# Patient Record
Sex: MALE | Race: WHITE
[De-identification: names, ages, dates, MRNs, and addresses within clinical notes are randomized per-mention and may not be internally consistent; named-entity substitution may affect disease eponyms.]

---

## 2017-09-01 ENCOUNTER — HOSPITAL ENCOUNTER (OUTPATIENT)
Dept: HOSPITAL 75 - PREOP | Age: 72
End: 2017-09-01
Attending: SURGERY
Payer: COMMERCIAL

## 2017-09-01 VITALS — BODY MASS INDEX: 33.18 KG/M2 | WEIGHT: 245 LBS | HEIGHT: 72 IN

## 2017-09-01 DIAGNOSIS — K80.20: ICD-10-CM

## 2017-09-01 DIAGNOSIS — Z01.818: Primary | ICD-10-CM

## 2017-09-08 ENCOUNTER — HOSPITAL ENCOUNTER (OUTPATIENT)
Dept: HOSPITAL 75 - SDC | Age: 72
Discharge: HOME | End: 2017-09-08
Attending: SURGERY
Payer: COMMERCIAL

## 2017-09-08 VITALS — BODY MASS INDEX: 33.18 KG/M2 | WEIGHT: 245 LBS | HEIGHT: 72 IN

## 2017-09-08 VITALS — SYSTOLIC BLOOD PRESSURE: 152 MMHG | DIASTOLIC BLOOD PRESSURE: 80 MMHG

## 2017-09-08 VITALS — SYSTOLIC BLOOD PRESSURE: 147 MMHG | DIASTOLIC BLOOD PRESSURE: 83 MMHG

## 2017-09-08 VITALS — SYSTOLIC BLOOD PRESSURE: 148 MMHG | DIASTOLIC BLOOD PRESSURE: 82 MMHG

## 2017-09-08 VITALS — SYSTOLIC BLOOD PRESSURE: 160 MMHG | DIASTOLIC BLOOD PRESSURE: 100 MMHG

## 2017-09-08 DIAGNOSIS — J45.909: ICD-10-CM

## 2017-09-08 DIAGNOSIS — G47.33: ICD-10-CM

## 2017-09-08 DIAGNOSIS — K21.9: ICD-10-CM

## 2017-09-08 DIAGNOSIS — E66.9: ICD-10-CM

## 2017-09-08 DIAGNOSIS — I10: ICD-10-CM

## 2017-09-08 DIAGNOSIS — K80.10: Primary | ICD-10-CM

## 2017-09-08 DIAGNOSIS — E78.5: ICD-10-CM

## 2017-09-08 DIAGNOSIS — F32.9: ICD-10-CM

## 2017-09-08 DIAGNOSIS — Z79.899: ICD-10-CM

## 2017-09-08 DIAGNOSIS — E03.9: ICD-10-CM

## 2017-09-08 DIAGNOSIS — E11.9: ICD-10-CM

## 2017-09-08 DIAGNOSIS — N28.9: ICD-10-CM

## 2017-09-08 PROCEDURE — 94664 DEMO&/EVAL PT USE INHALER: CPT

## 2017-09-08 PROCEDURE — 87081 CULTURE SCREEN ONLY: CPT

## 2017-09-08 PROCEDURE — 82962 GLUCOSE BLOOD TEST: CPT

## 2017-09-08 RX ADMIN — SODIUM CHLORIDE, SODIUM LACTATE, POTASSIUM CHLORIDE, AND CALCIUM CHLORIDE PRN MLS/HR: 600; 310; 30; 20 INJECTION, SOLUTION INTRAVENOUS at 10:25

## 2017-09-08 RX ADMIN — SODIUM CHLORIDE, SODIUM LACTATE, POTASSIUM CHLORIDE, AND CALCIUM CHLORIDE PRN MLS/HR: 600; 310; 30; 20 INJECTION, SOLUTION INTRAVENOUS at 08:33

## 2017-09-08 NOTE — DISCHARGE INST-SIMPLE/STANDARD
Discharge Inst-Standard


Discharge Medications


New, Converted or Re-Newed RX:  RX on Chart





Patient Instructions/Follow Up


Plan of Care/Instructions/FU:  


Band-Aids off in 48 hours.  Follow-up in 3 weeks.  Incentive spirometry.


Activity as Tolerated:  Yes


Discharge Diet:  No Restrictions











TEX BENTON MD Sep 8, 2017 11:37 am

## 2017-09-08 NOTE — OPERATIVE REPORT
Operative Report


Date of Procedure/Surgery


Sep 8, 2017


Surgeon (s)


TEX BENTON MD


Assistant (s):  Not applicable





Post-Operative Diagnosis





Same





Procedure Performed





Robotic-assisted cholecystectomy





Description of Procedure


Anesthesia Type:  General


Estimated blood loss (mL):  Minimal


Specimen(s) collected/removed


gallbladder


Description of the Procedure


Indication for procedure: This gentleman presented with symptomatic gallstones 

and evidence of chronic cholecystitis.  He was offered cholecystectomy using 

minimal invasive technique with robotic assistance.  Informed consent was 

obtained after reviewing the operative details and complications of wound 

infection, worsening of his COPD requiring ventilatory support and bile leak.  


Description of the procedure: He was placed supine on the operative table and 

general anesthesia induced using an endotracheal tube.  Clindamycin and Flagyl 

were administered intravenously as prophylaxis against wound infection.  

Sequential compression devices were placed around his legs Aida to minimize the 

risk of venous thrombosis.





Abdomen was prepared and draped in the usual sterile manner.A vertical incision 

was made over the umbilicus and pneumoperitoneum established using a Veress 

needle.  Intra-abdominal pressure was maintained at 15 mmHg with carbon dioxide 

insufflation.  A 12 mm trocar was placed and anatomy visualized using the high 

definition, 3-dimensional laparoscope associated with da Sergio system.  Omentum 

was wrapped around the liver which was slightly enlargedunit





Under direct view, I placed an 8 mm cannula over each side of the abdomen 

followed by a 5 mm trocar over the left upper quadrant.  The patient was then 

turned into reverse Trendelenburg position with the right side tilted up.  The 

robotic system was then docked into place.





Omentum was displaced out of the right upper quadrant revealing an elongated 

gallbladder with evidence of chronic cholecystitis.  The fundus was retracted 

cephalad and the infundibulum grasped with robotic Cardiere forceps.tissue 

around the neck of the gallbladder leading onto Calot's  triangle was incised 

using the hook cautery, revealing the cystic duct and artery; both were divided 

between locking clips.  Cholecystectomy was completed using minimal cautery.  

Bleeding from the gallbladder fossa was controlled using cautery as well.  

Subhepatic space was irrigated with saline and the gallbladder placed in an 

Endo Catch bag to be removed via the umbilical trocar site.  The fascia over 

this incision was closed using #1 Vicryl using a Benjamin Modi device under 

direct view.  Skin incisions were closed using 4-0 Vicryl, in a subcuticular 

fashion.





0.5 percent Marcaine with epinephrine was infiltrated along the incisions, both 

pre-emptively and at the conclusion of the operation





He tolerated the procedure well, was extubated in the operating room and taken 

to the recovery room in a stable condition.


Findings of the Procedure


See the op note





Allergies and Home Medications


Allergies


Coded Allergies:  


     Penicillins (Verified  Allergy, Unknown, HIVES, 9/1/17)





Home Medications


Canagliflozin 100 Mg Tablet, 100 MG PO DAILY, (Reported)


Esomeprazole Magnesium 40 Mg Capsule.dr, 40 MG PO DAILY, (Reported)


Exenatide Microspheres 2 Mg Vial, 2 MG SQ TWICE A WEEK, (Reported)


Ezetimibe 10 Mg Tablet, 10 MG PO HS, (Reported)


Furosemide 20 Mg Tablet, 20 MG PO DAILY, (Reported)


Insulin Glargine,Hum.rec.anlog 100 Unit/1 Ml Insuln.pen, 50 UNIT SQ BID, (

Reported)


Insulin Lispro 100 Unit/1 Ml Cartridge, 10 UNIT SQ TIDAC, (Reported)


Ipratropium Bromide 12.9 Gm Aers, 2 PUFF IH QID, (Reported)


Levothyroxine Sodium 137 Mcg Tablet, 137 MCG PO DAILY, (Reported)


Lisinopril 10 Mg Tablet, 10 MG PO HS, (Reported)


Rosuvastatin Calcium 20 Mg Tablet, 20 MG PO HS, (Reported)


Salmeterol Xinafoate 50 Mcg Disk, 50 MCG IH BID, (Reported)


Trazodone HCl 150 Mg Tablet, 150 MG PO HS, (Reported)


Venlafaxine HCl 150 Mg Cap.er.24h, 150 MG PO DAILY, (Reported)











TEX BENTON MD Sep 8, 2017 11:35 am

## 2017-09-08 NOTE — PROGRESS NOTE-PRE OPERATIVE
Pre-Operative Progress Note


H&P Reviewed


The H&P was reviewed, patient examined and no changes noted.


Date Seen by Provider:  Jul 11, 2017


Time Seen by Provider:  11:22


Date H&P Reviewed:  Sep 8, 2017


Time H&P Reviewed:  09:28


Pre-Operative Diagnosis:  Gallstone with chronic cholecystits











TEX BENTON MD Sep 8, 2017 9:28 am

## 2017-09-08 NOTE — HISTORY & PHYSICIAL
History of Present Illness


History of Present Illness


Reason for visit/HPI


To undergo robotic assisted cholecystectomy with cholangiogram


Date of Admission





Date Seen by Provider:  2017


Time Seen by Provider:  09:24


I consulted on this patient on


17


 09:23


Attending Physician


Tex Benton MD


Admitting Physician


Jeferson Hobson DO


Consult








Allergies and Home Medications


Allergies


Coded Allergies:  


     Penicillins (Verified  Allergy, Unknown, HIVES, 17)





Home Medications


Canagliflozin 100 Mg Tablet, 100 MG PO DAILY, (Reported)


Esomeprazole Magnesium 40 Mg Capsule.dr, 40 MG PO DAILY, (Reported)


Exenatide Microspheres 2 Mg Vial, 2 MG SQ TWICE A WEEK, (Reported)


Ezetimibe 10 Mg Tablet, 10 MG PO HS, (Reported)


Furosemide 20 Mg Tablet, 20 MG PO DAILY, (Reported)


Insulin Glargine,Hum.rec.anlog 100 Unit/1 Ml Insuln.pen, 50 UNIT SQ BID, (

Reported)


Insulin Lispro 100 Unit/1 Ml Cartridge, 10 UNIT SQ TIDAC, (Reported)


Ipratropium Bromide 12.9 Gm Aers, 2 PUFF IH QID, (Reported)


Levothyroxine Sodium 137 Mcg Tablet, 137 MCG PO DAILY, (Reported)


Lisinopril 10 Mg Tablet, 10 MG PO HS, (Reported)


Rosuvastatin Calcium 20 Mg Tablet, 20 MG PO HS, (Reported)


Salmeterol Xinafoate 50 Mcg Disk, 50 MCG IH BID, (Reported)


Trazodone HCl 150 Mg Tablet, 150 MG PO HS, (Reported)


Venlafaxine HCl 150 Mg Cap.er.24h, 150 MG PO DAILY, (Reported)





Past Medical-Social-Family Hx


Patient Social History


Marrital Status:  


Employed/Student:  employed


Alcohol Use:  Regular Use


Number of Drinks Today:  AA


Alcohol Beverage of Choice:  Beer


Recreational Drug Use:  No


Smoking Status:  Never a Smoker


Recent Foreign Travel:  No


Contact w/other who traveled:  No


Recent Hopitalizations:  No


Recent Infectious Disease Expo:  No





Immunizations Up To Date


Date of Pneumonia Vaccine:  Sep 1, 2016





Seasonal Allergies


Seasonal Allergies:  No





Surgeries


Yes


Appendectomy





Respiratory


Yes


Chronic Bronchitis





Cardiovascular


Yes


Hypertension





Genitourinary


Kidney Stones





Gastrointestinal


Gall Bladder Disease





Musculoskeletal


Arthritis





Constitutional:  no symptoms reported


EENTM:  no symptoms reported


Respiratory:  see HPI


Cardiovascular:  no symptoms reported


Gastrointestinal:  RUQ, abdominal pain (RUQ)


Genitourinary:  no symptoms reported


Musculoskeletal:  no symptoms reported


Skin:  no symptoms reported


Psychiatric/Neurological:  No Symptoms Reported





Physical Exam


Vital Signs





Vital Sign - Last 12Hours








 17





 07:40


 


Temp 97.7


 


Pulse 86


 


Resp 18


 


B/P (MAP) 160/100


 


Pulse Ox 94


 


O2 Delivery Room Air





Capillary Refill :


General Appearance:  No Apparent Distress


HEENT:  Normal ENT Inspection


Neck:  Normal Inspection


Respiratory:  Lungs Clear


Cardiovascular:  Regular Rate, Rhythm


Gastrointestinal:  Non Tender, Soft


Neurologic/Psychiatric:  Alert, Oriented x3


Skin:  Warm/Dry





Assessment/Plan


Assessment and Plan


Symptomatic gallstone with chronic cholecystits. For robotic assisted 

cholecystectomy with cholangiogram. Discussed in detail.


Problems:  





Admission Diagnosis


Gallstone with chronic cholecystitis





Clinical Quality Measures


DVT/VTE Risk/Contraindication:


Risk Factor Score Per Nursin


RFS Level Per Nursing on Admit:  4+=Very High











TEX BENTON MD Sep 8, 2017 9:28 am

## 2023-08-08 ENCOUNTER — HOSPITAL ENCOUNTER (OUTPATIENT)
Dept: HOSPITAL 75 - WOUNDCARE | Age: 78
End: 2023-08-08
Attending: FAMILY MEDICINE
Payer: MEDICARE

## 2023-08-08 DIAGNOSIS — L97.512: Primary | ICD-10-CM

## 2023-08-08 DIAGNOSIS — L03.031: ICD-10-CM

## 2023-08-08 DIAGNOSIS — E66.01: ICD-10-CM

## 2023-08-08 DIAGNOSIS — E11.621: ICD-10-CM

## 2023-08-08 DIAGNOSIS — E11.40: ICD-10-CM

## 2023-08-08 PROCEDURE — 11042 DBRDMT SUBQ TIS 1ST 20SQCM/<: CPT

## 2023-08-18 ENCOUNTER — HOSPITAL ENCOUNTER (OUTPATIENT)
Dept: HOSPITAL 75 - WOUNDCARE | Age: 78
End: 2023-08-18
Attending: FAMILY MEDICINE
Payer: MEDICARE

## 2023-08-18 DIAGNOSIS — L97.512: ICD-10-CM

## 2023-08-18 DIAGNOSIS — E66.01: ICD-10-CM

## 2023-08-18 DIAGNOSIS — E11.40: ICD-10-CM

## 2023-08-18 DIAGNOSIS — E11.621: Primary | ICD-10-CM

## 2023-08-18 PROCEDURE — 11042 DBRDMT SUBQ TIS 1ST 20SQCM/<: CPT

## 2023-09-01 ENCOUNTER — HOSPITAL ENCOUNTER (OUTPATIENT)
Dept: HOSPITAL 75 - WOUNDCARE | Age: 78
End: 2023-09-01
Attending: FAMILY MEDICINE
Payer: MEDICARE

## 2023-09-01 DIAGNOSIS — E11.40: ICD-10-CM

## 2023-09-01 DIAGNOSIS — E66.01: ICD-10-CM

## 2023-09-01 DIAGNOSIS — L97.512: ICD-10-CM

## 2023-09-01 DIAGNOSIS — E11.621: Primary | ICD-10-CM

## 2023-09-01 PROCEDURE — 99212 OFFICE O/P EST SF 10 MIN: CPT
